# Patient Record
Sex: FEMALE | Race: WHITE | NOT HISPANIC OR LATINO | Employment: STUDENT | URBAN - METROPOLITAN AREA
[De-identification: names, ages, dates, MRNs, and addresses within clinical notes are randomized per-mention and may not be internally consistent; named-entity substitution may affect disease eponyms.]

---

## 2017-07-06 DIAGNOSIS — R41.3 MEMORY LOSS: Primary | ICD-10-CM

## 2017-07-06 DIAGNOSIS — I63.9 STROKE: Primary | ICD-10-CM

## 2017-07-12 ENCOUNTER — CLINICAL SUPPORT (OUTPATIENT)
Dept: REHABILITATION | Facility: HOSPITAL | Age: 37
End: 2017-07-12
Attending: GENERAL PRACTICE
Payer: MEDICAID

## 2017-07-12 DIAGNOSIS — R41.841 COGNITIVE COMMUNICATION DEFICIT: ICD-10-CM

## 2017-07-12 PROCEDURE — 92523 SPEECH SOUND LANG COMPREHEN: CPT | Mod: PO

## 2017-07-12 NOTE — PROGRESS NOTES
Date: 07/12/2017    Start Time:  1645  Stop Time:  1730      OUTPATIENT NEUROLOGICAL REHABILITATION  SPEECH THERAPY EVALUATION    Subjective/History  Onset Date:  May 2017  Primary Diagnosis:  Stroke with left side weakness  Treatment Diagnosis:  Cognitive-Communicative Impairment  Referring Provider:  Dr. Romy Torres  Orders: ST for evaluation and treat  Current Medical History:  Mrs. Reyna presents to the [facility] secondary to the diagnosis of memory loss due to stroke she suffered in May 2017. She stated that she has been having memory issues and forgetting things since her stroke. Mrs. Reyna is currently 6 weeks pregnant. She arrived to this evaluation with two of her four children.   Past Medical History: No past medical history on file.  Precautions:  Memory loss  Prior Therapy: ST Evaluation while in [facility] with no follow up therapy.   Pain: 0/10  Nutrition:  Regular consistency with thin liquids  Environmental Concerns/Cultural/Spiritual/Developmental/Educational Needs: None  Prior Level of Function: Independent  Social History:  Mrs. Reyna lives at home with her  and four children (ages 18 months to 9 years old). She is 6 weeks pregnant with her fifth child. The youngest child has Down's Syndrome and receives therapy through [facility]. 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 She reported that she was studying for her Nursing [email or web address] (exam) prior to her stroke.   Signs of Abuse: No  Functional Deficits Leading to Referral/Nature of Injury:  Memory loss  Patient Therapy Goals:  To improve her memory    Objective     Cognitive Linguistic Quick Test (CLQT) was administered to quickly assess the patient's overall cognitive-linguistic function and to determine cognitive strengths and weaknesses.             Cognitive Domain Score     Severity Rating      Attention     181/215    WNL  Memory    146/185    MILD  Executive Functions   29/40     WNL  Language    28/37     WNL  Visuospatial Skills    91/105     WNL  Clock Drawing    13/13     WNL     Composite Severity Rating  3.8/4.0     WNL      Auditory Comprehension: Within normal limits.    Reading Comprehension: Did not assess but will at next visit.     Verbal Expression: Within functional limits.     Written Expression: Ddi not assess but will at next visit.     Cognition: Short term memory was impaired. Mrs. Reyna recalled details from a story with 60% accuracy. She reported that she forgets appointments, children's scheduled activities, and sometimes she forgets to take her medications. Visual memory and visual attention were mildly impaired as demonstrated on a design memory task (100% accuracy) and symbol cancellation task (83% accuracy). Mental flexibility and thought organization were impaired (53% accuracy) as demonstrated in a design generation task. Mrs. Reyna listed 13 items in a concrete category and 6 items in an abstract category both within one minute (norm = 15 to 20).  Her attention/concentration, multi-tasking, organization skills were mildly impaired in daily tasks. She was able to attend to most of the tasks today while her children were in the therapy room. She reported that she does not use a calendar because she forgets to look at it. Further assessment of higher level problem solving and reasoning skills will be completed.     Motor Speech/Fluency/Voice: Oral motor exam revealed: Mild facial droop noted on the left side. Mild decreased strength and range of motion for lingual, buccal, labial, and velar muscles but considered within functional limits. Velum deviated to the left but had good elevation. Diadochokinetic (DDK) rates for rapid repetition of 1 - 3 syllable utterances were within functional limits. Speech was intelligible 100%  of the time. Voice and fluency were within functional limits.     Swallowing: No concerns were reported.     Hearing: Within functional limits.     Assessment/Impressions  Mrs. Reyna exhibited a mild cognitive-communicative impairment due to her stroke. Her deficits were characterized by decreased short term memory, decreased attention/concentration, decreased mental flexibility, decreased thought organization, and decrease word fluency. Higher level problem solving and reasoning skills will be further assessed. She does not use compensatory strategies consistently. Speech, voice, fluency, and swallowing skills were within functional limits. Mrs. Reyna will benefit from outpatient neurological rehabilitation speech therapy.    Functional Communication Measure (FCM):   Scores based on Severity Modifier for Medicare G-Code:  Memory  Current status: FCM:  Level 5   - CJ   Projected status:  FCM:  Level 6   - CI     Rehab Potential: good    Short Term Goals (4 weeks):   1. Mrs. Reyna will complete short term memory tasks using compensatory strategies with 90% accuracy IND'ly.   2. Mrs. Reyna complete moderate to complex problem solving, reasoning, and organization tasks with 90% accuracy IND'ly.   3. Mrs. Reyna complete mental flexibility/mental manipulation tasks with 90% accuracy IND'ly.   4. Mrs. Reyna will list 15 to 20 items in categories within one minute IND'ly.   5. Mrs. Reyna will complete moderate to complex attention/concentration tasks with 90% accuracy IND'ly.    Long Term Goals (6 weeks):   1. Mrs. Reyna will increase her cognitive-communication skills to improve functional independence.   2. Mrs. Reyna  Demonstrate understanding and use of compensatory strategies and/or external aids to improve cognitive-communicative skills and functional independence.     Education: Mrs. Renya was educated on the plan of care. She verbalized and demonstrated understanding and agreed with  the plan of care.     Plan  Certification Period: 07/06/17 to 12/31/17  Plan of Care Certification Period: 07/12/17 to 08/25/17    Recommended Treatment Plan:  Mrs. Reyna will participate in the [facility] program for speech therapy 2 times per week for 6 weeks to address her  Cognitive-communicative deficits, educate patient/family, and home exercise program.    Other Recommendations: None at this time    ADY Lorenzo, CCC-SLP  Speech-Language Pathologist  Outpatient Neurological Rehabilitation      Date: 07/12/2017    I certify the need for these services furnished under this plan of treatment and while under my care.  ____________________________________ Physician/Referring Practitioner   Date of Signature

## 2017-07-13 NOTE — PLAN OF CARE
Date: 07/12/2017    Start Time:  1645  Stop Time:  1730      OUTPATIENT NEUROLOGICAL REHABILITATION  SPEECH THERAPY EVALUATION    Subjective/History  Onset Date:  May 2017  Primary Diagnosis:  Stroke with left side weakness  Treatment Diagnosis:  Cognitive-Communicative Impairment  Referring Provider:  Dr. Romy Torres  Orders: ST for evaluation and treat  Current Medical History:  Mrs. Reyna presents to the [facility] secondary to the diagnosis of memory loss due to stroke she suffered in May 2017. She stated that she has been having memory issues and forgetting things since her stroke. Mrs. Reyna is currently 6 weeks pregnant. She arrived to this evaluation with two of her four children.   Past Medical History: No past medical history on file.  Precautions:  Memory loss  Prior Therapy: ST Evaluation while in [facility] with no follow up therapy.   Pain: 0/10  Nutrition:  Regular consistency with thin liquids  Environmental Concerns/Cultural/Spiritual/Developmental/Educational Needs: None  Prior Level of Function: Independent  Social History:  Mrs. Reyna lives at home with her  and four children (ages 18 months to 9 years old). She is 6 weeks pregnant with her fifth child. The youngest child has Down's Syndrome and receives therapy through [facility]. 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 She reported that she was studying for her Nursing [employer] (exam) prior to her stroke.   Signs of Abuse: No  Functional Deficits Leading to Referral/Nature of Injury:  Memory loss  Patient Therapy Goals:  To improve her memory    Objective     Cognitive Linguistic Quick Test (CLQT) was administered to quickly assess the patient's overall cognitive-linguistic function and to determine cognitive strengths and weaknesses.             Cognitive Domain Score     Severity Rating      Attention     181/215    WNL  Memory    146/185    MILD  Executive Functions   29/40     WNL  Language    28/37     WNL  Visuospatial Skills    91/105     WNL  Clock Drawing    13/13     WNL     Composite Severity Rating  3.8/4.0     WNL      Auditory Comprehension: Within normal limits.    Reading Comprehension: Did not assess but will at next visit.     Verbal Expression: Within functional limits.     Written Expression: Ddi not assess but will at next visit.     Cognition: Short term memory was impaired. Mrs. Reyna recalled details from a story with 60% accuracy. She reported that she forgets appointments, children's scheduled activities, and sometimes she forgets to take her medications. Visual memory and visual attention were mildly impaired as demonstrated on a design memory task (100% accuracy) and symbol cancellation task (83% accuracy). Mental flexibility and thought organization were impaired (53% accuracy) as demonstrated in a design generation task. Mrs. Reyna listed 13 items in a concrete category and 6 items in an abstract category both within one minute (norm = 15 to 20).  Her attention/concentration, multi-tasking, organization skills were mildly impaired in daily tasks. She was able to attend to most of the tasks today while her children were in the therapy room. She reported that she does not use a calendar because she forgets to look at it. Further assessment of higher level problem solving and reasoning skills will be completed.     Motor Speech/Fluency/Voice: Oral motor exam revealed: Mild facial droop noted on the left side. Mild decreased strength and range of motion for lingual, buccal, labial, and velar muscles but considered within functional limits. Velum deviated to the left but had good elevation. Diadochokinetic (DDK) rates for rapid repetition of 1 - 3 syllable utterances were within functional limits. Speech was intelligible 100%  of the time. Voice and fluency were within functional limits.     Swallowing: No concerns were reported.     Hearing: Within functional limits.     Assessment/Impressions  Mrs. Reyna exhibited a mild cognitive-communicative impairment due to her stroke. Her deficits were characterized by decreased short term memory, decreased attention/concentration, decreased mental flexibility, decreased thought organization, and decrease word fluency. Higher level problem solving and reasoning skills will be further assessed. She does not use compensatory strategies consistently. Speech, voice, fluency, and swallowing skills were within functional limits. Mrs. Reyna will benefit from outpatient neurological rehabilitation speech therapy.    Functional Communication Measure (FCM):   Scores based on Severity Modifier for Medicare G-Code:  Memory  Current status: FCM:  Level 5   - CJ   Projected status:  FCM:  Level 6   - CI     Rehab Potential: good    Short Term Goals (4 weeks):   1. Mrs. Reyna will complete short term memory tasks using compensatory strategies with 90% accuracy IND'ly.   2. Mrs. Reyna complete moderate to complex problem solving, reasoning, and organization tasks with 90% accuracy IND'ly.   3. Mrs. Reyna complete mental flexibility/mental manipulation tasks with 90% accuracy IND'ly.   4. Mrs. Reyna will list 15 to 20 items in categories within one minute IND'ly.   5. Mrs. Reyna will complete moderate to complex attention/concentration tasks with 90% accuracy IND'ly.    Long Term Goals (6 weeks):   1. Mrs. Reyna will increase her cognitive-communication skills to improve functional independence.   2. Mrs. Reyna  Demonstrate understanding and use of compensatory strategies and/or external aids to improve cognitive-communicative skills and functional independence.     Education: Mrs. Reyna was educated on the plan of care. She verbalized and demonstrated understanding and agreed with  the plan of care.     Plan  Certification Period: 07/06/17 to 12/31/17  Plan of Care Certification Period: 07/12/17 to 08/25/17    Recommended Treatment Plan:  Mrs. Reyna will participate in the [facility] program for speech therapy 2 times per week for 6 weeks to address her  Cognitive-communicative deficits, educate patient/family, and home exercise program.    Other Recommendations: None at this time    ADY Lorenzo, CCC-SLP  Speech-Language Pathologist  Outpatient Neurological Rehabilitation      Date: 07/12/2017    I certify the need for these services furnished under this plan of treatment and while under my care.  ____________________________________ Physician/Referring Practitioner   Date of Signature

## 2017-07-18 ENCOUNTER — CLINICAL SUPPORT (OUTPATIENT)
Dept: REHABILITATION | Facility: HOSPITAL | Age: 37
End: 2017-07-18
Attending: GENERAL PRACTICE
Payer: MEDICAID

## 2017-07-18 DIAGNOSIS — R41.841 COGNITIVE COMMUNICATION DEFICIT: Primary | ICD-10-CM

## 2017-07-18 PROCEDURE — 92507 TX SP LANG VOICE COMM INDIV: CPT | Mod: PO

## 2017-07-18 NOTE — PROGRESS NOTES
OUTPATIENT NEUROLOGICAL REHABILITATION  SPEECH THERAPY PROGRESS NOTE    Date:  07/18/2017    Start Time:  1305  Stop Time:  1345    Subjective/History  Onset Date:  May 2017  Primary Diagnosis:  Stroke with left side weakness  Treatment Diagnosis:  Cognitive-Communicative Impairment  Referring Provider:  Dr. Romy Torres  Orders: ST for evaluation and treat  Current Medical History:  Mrs. Reyna presents to the [facility] secondary to the diagnosis of memory loss due to stroke she suffered in May 2017. She stated that she has been having memory issues and forgetting things since her stroke. Mrs. Reyna is currently 6 weeks pregnant. She arrived to this evaluation with two of her four children.   Past Medical History: No past medical history on file.  Precautions:  General         TIMED  Procedure Time Min.    Start:   Stop:       Start:  Stop:     Start:  Stop:     Start:  Stop:          UNTIMED  Procedure Time Min.   Speech and Language Therapy Start:  1305  Stop: 1345  40    Start:  Stop:      Total Minutes: 40  Total Timed Units: 0  Total Untimed Units: 1  Charges Billed/# of units: 1    Visit #: 2  Date of Evaluation: 7/12/17  Plan of Care Expiration:  07/12/17 to 08/25/17     Extended POC:         Progress/Current Status    Subjective:     Pain: 0 /10  Pt was pleasant and communicative in therapy.     Objective:     Short Term Goals (4 weeks):   1. Mrs. Reyna will complete short term memory tasks using compensatory strategies with 90% accuracy IND'ly.   2. Mrs. Reyna complete moderate to complex problem solving, reasoning, and organization tasks with 90% accuracy IND'ly.   3. Mrs. Reyna complete mental flexibility/mental manipulation tasks with 90% accuracy IND'ly.   4. Mrs. Reyna will list 15 to 20 items in categories within one minute IND'ly.   5. Mrs. Reyna will complete moderate to complex attention/concentration tasks with 90% accuracy  "IND'ly.  6. New Goal:  Mrs. Reyna will complete an assessment of reading and writing skills. Goal MEt 7/18/17  6a. New Goal: Mrs. Reyna will complete complex reading comprehension tasks with 90% accuracy IND'ly.     Current Progress Toward Short Term Goals:  1.  Pt completed short term memory tasks with 100% accuracy including visual stimuli (i.e. Constant Therapy Picture N Back Level 1). Pt reports more difficulty with auditory stimuli.   - Pt reports forgetting when someone greets her or tells her goodbye. She is forgetting medications and to pick her children up from school. Discussed utilization of external memory aids. Pt reports that "I didn't have to do that before". SLP explained that these aids are necessary now post-stroke. Use of external memory aids such as a pill organizer, an alarm to remind pt to take medication, and a calendar (whether an abhilash or a paper calendar) were reviewed. Pt verbalized understanding.   2.  Pt completed deduction puzzle 1 with 100% accuracy IND'ly.   3. n/a  4. n/a  5. Pt completed an assessment of reading and writing skills. Pt was asked to read two paragraphs and answer whether statements were true, false, or unknown. She completed this task with 70% accuracy indicating impaired reading skills. She was asked to write a thank you note for a writing assessment. Pt completed task with no grammatical, syntactical, or semantic errors. Pt's writing skills are considered WFL.     Long Term Goals (6 weeks):   1. Mrs. Reyna will increase her cognitive-communication skills to improve functional independence.   2. Mrs. Reyna  Demonstrate understanding and use of compensatory strategies and/or external aids to improve cognitive-communicative skills and functional independence.     Assessment:     Mrs. Reyna exhibited a mild cognitive-communicative impairment due to her stroke. Her deficits were characterized by decreased short term memory, decreased attention/concentration, " "decreased mental flexibility, decreased thought organization, and decrease word fluency. Higher level problem solving and reasoning skills will be further assessed. She does not use compensatory strategies consistently. Speech, voice, fluency, and swallowing skills were within functional limits. Mrs. Reyna will benefit from outpatient neurological rehabilitation speech therapy.    Functional Communication Measure (FCM):   Scores based on Severity Modifier for Medicare G-Code:  Memory  Current status: FCM:  Level 5   - CJ   Projected status:  FCM:  Level 6   - CI     Rehab Potential: good    Patient Education/Response:     Pt reports forgetting when someone greets her or tells her goodbye. She is forgetting medications and to pick her children up from school. Discussed utilization of external memory aids. Pt reports that "I didn't have to do that before". SLP explained that these aids are necessary now post-stroke. Use of external memory aids such as a pill organizer, an alarm to remind pt to take medication, and a calendar (whether an abhilash or a paper calendar) were reviewed. Pt verbalized understanding.     Plans and Goals:     Continue POC.  Certification Period: 07/06/17 to 12/31/17  Plan of Care Certification Period: 07/12/17 to 08/25/17    Recommended Treatment Plan:  Mrs. Reyna will participate in the [facility] program for speech therapy 2 times per week for 6 weeks to address her  Cognitive-communicative deficits, educate patient/family, and home exercise program.    Other Recommendations: None at this time    ADY Linares, CCC-SLP  Speech Language Pathologist  7/18/17  "

## 2017-07-25 ENCOUNTER — CLINICAL SUPPORT (OUTPATIENT)
Dept: REHABILITATION | Facility: HOSPITAL | Age: 37
End: 2017-07-25
Attending: GENERAL PRACTICE
Payer: MEDICAID

## 2017-07-25 DIAGNOSIS — R41.841 COGNITIVE COMMUNICATION DEFICIT: ICD-10-CM

## 2017-07-25 PROCEDURE — 92507 TX SP LANG VOICE COMM INDIV: CPT | Mod: PO

## 2017-07-25 NOTE — PROGRESS NOTES
OUTPATIENT NEUROLOGICAL REHABILITATION  SPEECH THERAPY PROGRESS NOTE    Date:  07/25/2017    Start Time:  1305  Stop Time:  1345    Subjective/History  Onset Date:  May 2017  Primary Diagnosis:  Stroke with left side weakness  Treatment Diagnosis:  Cognitive-Communicative Impairment  Referring Provider:  Dr. Romy Torres  Orders: ST for evaluation and treat  Current Medical History:  Mrs. Reyna presents to the [facility] secondary to the diagnosis of memory loss due to stroke she suffered in May 2017. She stated that she has been having memory issues and forgetting things since her stroke. Mrs. Reyna is currently 6 weeks pregnant. She arrived to this evaluation with two of her four children.   Past Medical History: No past medical history on file.  Past Medical History: No past medical history on file.  Precautions:  general        TIMED  Procedure Time Min.    Start:   Stop:       Start:  Stop:     Start:  Stop:     Start:  Stop:          UNTIMED  Procedure Time Min.   Speech and Language Therapy Start:  1305  Stop: 1345  40    Start:  Stop:      Total Minutes: 40  Total Timed Units: 0  Total Untimed Units: 1  Charges Billed/# of units: 1    Visit #: 3  Date of Evaluation: 7/12/17  Plan of Care Expiration:  07/12/17 to 08/25/17     Extended POC:       Progress/Current Status    Subjective:     Pain: 6 /10  Pt reports a headache of about a 6. She reports morning sickness that has prevented her from eating the last few days.     Objective:   Short Term Goals (4 weeks):   1. Mrs. Reyna will complete short term memory tasks using compensatory strategies with 90% accuracy IND'ly.   2. Mrs. Reyna complete moderate to complex problem solving, reasoning, and organization tasks with 90% accuracy IND'ly.   3. Mrs. Reyna complete mental flexibility/mental manipulation tasks with 90% accuracy IND'ly.   4. Mrs. Reyna will list 15 to 20 items in categories within  one minute IND'ly.   5. Mrs. Reyna will complete moderate to complex attention/concentration tasks with 90% accuracy IND'ly.  6. Mrs. Reyna will complete an assessment of reading and writing skills. Goal MEt 7/18/17  6a. Mrs. Reyna will complete complex reading comprehension tasks with 90% accuracy IND'ly.     Current Progress Toward Short Term Goals:  1.  Pt reports more difficulty with auditory stimuli.   - pt completed [email or web address] fit test. She scored the same or better than 40% of same age  Peers for memory.   2.  Pt completed mental manipulation tasks with 100% accuracy INDly (i.e. Reverse order, 4 words). METx2  3. Pt completed [email or web address] fit test. She scored the same or better than 0% of osmin age peers for mental flexibility.    4. Pt named an average of 17 items in a concrete category. METx1  5. Pt completed [email or web address] fit test as part of home program. She scored the same or better than 41% of same age peers for attention.   6. Goal Met 7/18/17  6a. n/a    Long Term Goals (6 weeks):   1. Mrs. Reyna will increase her cognitive-communication skills to improve functional independence.   2. Mrs. Reyna  Demonstrate understanding and use of compensatory strategies and/or external aids to improve cognitive-communicative skills and functional independence.     Assessment:     Mrs. Reyna exhibited a mild cognitive-communicative impairment due to her stroke. Her deficits were characterized by decreased short term memory, decreased attention/concentration, decreased mental flexibility, decreased thought organization, and decrease word fluency. Higher level problem solving and reasoning skills will be further assessed. She does not use compensatory strategies consistently. Speech, voice, fluency, and swallowing skills were within functional limits. Mrs. Reyna will benefit from outpatient neurological rehabilitation speech therapy.    Functional Communication Measure (FCM):   Scores based on Severity  Modifier for Medicare G-Code:  Memory  Current status: FCM:  Level 5   - CJ   Projected status:  FCM:  Level 6   - CI     Rehab Potential: good    Patient Education/Response:     [employer] home program and [employer] abhilash as an external aid were reviewed with pt. Pt verbalized understanding.    Plans and Goals:   Continue POC.  Certification Period: 07/06/17 to 12/31/17  Plan of Care Certification Period: 07/12/17 to 08/25/17    Recommended Treatment Plan:  Mrs. Reyna will participate in the [facility] program for speech therapy 2 times per week for 6 weeks to address her  Cognitive-communicative deficits, educate patient/family, and home exercise program.    Other Recommendations: None at this time    ADY Linares, CCC-SLP  Speech Language Pathologist  7/25/2017

## 2017-08-01 ENCOUNTER — CLINICAL SUPPORT (OUTPATIENT)
Dept: REHABILITATION | Facility: HOSPITAL | Age: 37
End: 2017-08-01
Attending: GENERAL PRACTICE
Payer: MEDICAID

## 2017-08-01 DIAGNOSIS — R41.841 COGNITIVE COMMUNICATION DEFICIT: Primary | ICD-10-CM

## 2017-08-01 PROCEDURE — 92507 TX SP LANG VOICE COMM INDIV: CPT | Mod: PO

## 2017-08-08 ENCOUNTER — CLINICAL SUPPORT (OUTPATIENT)
Dept: REHABILITATION | Facility: HOSPITAL | Age: 37
End: 2017-08-08
Attending: GENERAL PRACTICE
Payer: MEDICAID

## 2017-08-08 DIAGNOSIS — R41.841 COGNITIVE COMMUNICATION DEFICIT: ICD-10-CM

## 2017-08-08 PROCEDURE — 92507 TX SP LANG VOICE COMM INDIV: CPT | Mod: PO

## 2017-08-08 NOTE — PROGRESS NOTES
OUTPATIENT NEUROLOGICAL REHABILITATION  SPEECH THERAPY DISCHARGE SUMMARY    Date:  08/08/2017    Start Time:  1345  Stop Time:  1430    Subjective/History  Onset Date:  May 2017  Primary Diagnosis:  Stroke with left side weakness  Treatment Diagnosis:  Cognitive-Communicative Impairment  Referring Provider:  Dr. Romy Torres  Orders: ST for evaluation and treat  Current Medical History:  Mrs. Reyna presents to the [facility] secondary to the diagnosis of memory loss due to stroke she suffered in May 2017. She stated that she has been having memory issues and forgetting things since her stroke. Mrs. Reyna is currently 6 weeks pregnant. She arrived to this evaluation with two of her four children.   Past Medical History: No past medical history on file.  Precautions:  general        TIMED  Procedure Time Min.    Start:   Stop:       Start:  Stop:     Start:  Stop:     Start:  Stop:          UNTIMED  Procedure Time Min.   Speech and Language Therapy Start:  1345  Stop: 1430  45    Start:  Stop:      Total Minutes: 45  Total Timed Units: 0  Total Untimed Units: 1  Charges Billed/# of units: 1    Visit #: 5  Total # Of Visits:  6  Cancelled:  1  No Shows:  0  Date of Evaluation: 7/12/17  Plan of Care Expiration:  07/12/17 to 08/25/17     Extended POC:       Progress/Current Status    Subjective:     Pain: 0 /10  Pt reports that she is doing well.     Objective:   Physical/Functional Status:  At time of discharge, Mrs.. Reyna was able to recall memory strategies and suggested implementation for her daily life.  Short Term Goals (4 weeks):   1. Mrs. Reyna will complete short term memory tasks using compensatory strategies with 90% accuracy IND'ly. Goal Not Met / Discontinue  - Mrs. Reyna has not begun to use any compensatory strategies in her home. Until such time, Mrs. Reyna will discontinue ST. She attempted to download apps today to utilize for external  memory aids.     2. Mrs. Reyna complete moderate to complex problem solving, reasoning, and organization tasks with 90% accuracy IND'ly. Goal Met 8/8/17   - Mrs. Reyna completes moderate to complex problem solving, reasoning, and organization tasks IND'ly.     3. Mrs. Reyna complete mental flexibility/mental manipulation tasks with 90% accuracy IND'ly. Goal Not Met / Discontinue  - Mrs. Reyna scored the same or better than 0% of same age peers for mental flexibility for [email or web address] fit test.     4. Mrs. Reyna will list 15 to 20 items in categories within one minute IND'ly. Goal Met 8/8/17   - Pt named an average of 17 items in a category within one minute.     5. Mrs. Reyna will complete moderate to complex attention/concentration tasks with 90% accuracy IND'ly. Goal Met 8/8/17  - pt completed sustained attention tasks with no redirections. She reports no difficulties with attention / concentration at home.      6. Mrs. Reyna will complete an assessment of reading and writing skills. Goal Met 7/18/17  6a. Mrs. Reyna will complete complex reading comprehension tasks with 90% accuracy IND'ly. 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     Long Term Goals (6 weeks):   1. Mrs. Reyna will increase her cognitive-communication skills to improve functional independence. Goal Met 8/8/17   2. Mrs. Reyna  Demonstrate understanding and use of compensatory strategies and/or external aids to improve cognitive-communicative skills and functional independence. Goal Met 8/8/17     Assessment:     Mrs. Reyna exhibited a mild cognitive-communicative impairment due to her stroke. Her deficits were characterized by decreased short term memory, decreased attention/concentration, decreased mental flexibility, decreased thought organization, and decrease word fluency. Higher level problem solving and reasoning skills will be further assessed. She  does not use compensatory strategies consistently. Speech, voice, fluency, and swallowing skills were within functional limits. Mrs. Reyna no longer will benefit from skilled outpatient neurological rehabilitation speech therapy.    Functional Communication Measure (FCM):   Scores based on Severity Modifier for Medicare G-Code:  Memory  Current status: FCM:  Level 5   - CJ   Projected status:  FCM:  Level 6   - CI     Rehab Potential: good    Patient Education/Response:     Discharge plan reviewed with pt. Pt verbalized understanding.    Plans and Goals:     D/c ST.     Discharge Plan:  Mrs. Reyna  is being discharged from outpatient neuro rehab speech therapy for the following reason(s):  Patient has reached the maximum rehab potential for the present time      ADY Linares, CCC-SLP   8/8/2017

## 2017-09-11 PROBLEM — R41.841 COGNITIVE COMMUNICATION DEFICIT: Status: RESOLVED | Noted: 2017-07-12 | Resolved: 2017-09-11

## 2017-12-25 ENCOUNTER — OFFICE VISIT (OUTPATIENT)
Dept: URGENT CARE | Facility: CLINIC | Age: 37
End: 2017-12-25
Payer: MEDICAID

## 2017-12-25 VITALS
WEIGHT: 174 LBS | HEART RATE: 112 BPM | TEMPERATURE: 99 F | HEIGHT: 68 IN | SYSTOLIC BLOOD PRESSURE: 109 MMHG | DIASTOLIC BLOOD PRESSURE: 65 MMHG | OXYGEN SATURATION: 98 % | RESPIRATION RATE: 18 BRPM | BODY MASS INDEX: 26.37 KG/M2

## 2017-12-25 DIAGNOSIS — J02.9 SORE THROAT: ICD-10-CM

## 2017-12-25 DIAGNOSIS — J10.1 INFLUENZA A: Primary | ICD-10-CM

## 2017-12-25 LAB
CTP QC/QA: YES
CTP QC/QA: YES
FLUAV AG NPH QL: POSITIVE
FLUBV AG NPH QL: NEGATIVE
S PYO RRNA THROAT QL PROBE: NEGATIVE

## 2017-12-25 PROCEDURE — 87804 INFLUENZA ASSAY W/OPTIC: CPT | Mod: QW,S$GLB,, | Performed by: NURSE PRACTITIONER

## 2017-12-25 PROCEDURE — 99214 OFFICE O/P EST MOD 30 MIN: CPT | Mod: 25,S$GLB,, | Performed by: NURSE PRACTITIONER

## 2017-12-25 PROCEDURE — 87880 STREP A ASSAY W/OPTIC: CPT | Mod: QW,S$GLB,, | Performed by: NURSE PRACTITIONER

## 2017-12-25 RX ORDER — ENOXAPARIN SODIUM 100 MG/ML
1 INJECTION SUBCUTANEOUS
COMMUNITY

## 2017-12-25 RX ORDER — OSELTAMIVIR PHOSPHATE 75 MG/1
75 CAPSULE ORAL 2 TIMES DAILY
Qty: 10 CAPSULE | Refills: 0 | Status: SHIPPED | OUTPATIENT
Start: 2017-12-25 | End: 2017-12-25 | Stop reason: SDUPTHER

## 2017-12-25 RX ORDER — OSELTAMIVIR PHOSPHATE 75 MG/1
75 CAPSULE ORAL 2 TIMES DAILY
Qty: 10 CAPSULE | Refills: 0 | Status: SHIPPED | OUTPATIENT
Start: 2017-12-25 | End: 2017-12-30

## 2017-12-25 RX ORDER — ASPIRIN 325 MG
325 TABLET ORAL DAILY
COMMUNITY

## 2017-12-25 NOTE — PROGRESS NOTES
"Subjective:       Patient ID: Katty Reyna is a 37 y.o. female.    Vitals:  height is 5' 8" (1.727 m) and weight is 78.9 kg (174 lb). Her temperature is 99.3 °F (37.4 °C). Her blood pressure is 109/65 and her pulse is 112 (abnormal). Her respiration is 18 and oxygen saturation is 98%.     Chief Complaint: URI    URI    This is a new problem. The current episode started yesterday. There has been no fever. Associated symptoms include congestion, coughing and a sore throat. Pertinent negatives include no abdominal pain, chest pain, ear pain, headaches, nausea or wheezing. She has tried acetaminophen for the symptoms.     Review of Systems   Constitution: Negative for chills, fever and malaise/fatigue.   HENT: Positive for congestion and sore throat. Negative for ear pain and hoarse voice.    Eyes: Negative for discharge and redness.   Cardiovascular: Negative for chest pain, dyspnea on exertion and leg swelling.   Respiratory: Positive for cough and sputum production. Negative for shortness of breath and wheezing.    Musculoskeletal: Negative for myalgias.   Gastrointestinal: Negative for abdominal pain and nausea.   Neurological: Negative for headaches.       Objective:      Physical Exam   Constitutional: She is oriented to person, place, and time. She appears well-developed and well-nourished. She is cooperative.  Non-toxic appearance. She does not appear ill. No distress.   HENT:   Head: Normocephalic and atraumatic.   Right Ear: Hearing, tympanic membrane, external ear and ear canal normal.   Left Ear: Hearing, tympanic membrane and ear canal normal.   Nose: No mucosal edema, rhinorrhea or nasal deformity. No epistaxis. Right sinus exhibits no maxillary sinus tenderness and no frontal sinus tenderness. Left sinus exhibits no maxillary sinus tenderness and no frontal sinus tenderness.   Mouth/Throat: Uvula is midline and mucous membranes are normal. No trismus in the jaw. Normal dentition. No uvula swelling. No " posterior oropharyngeal erythema.   Eyes: Conjunctivae and lids are normal. Right eye exhibits no discharge. Left eye exhibits no discharge. No scleral icterus.   Sclera clear bilat   Neck: Trachea normal, normal range of motion, full passive range of motion without pain and phonation normal. Neck supple.   Cardiovascular: Normal rate, regular rhythm and normal pulses.    Pulmonary/Chest: No respiratory distress.   Abdominal: Normal appearance. She exhibits no distension, no pulsatile midline mass and no mass. There is no tenderness.   Musculoskeletal: Normal range of motion. She exhibits no edema or deformity.   Neurological: She is alert and oriented to person, place, and time. She exhibits normal muscle tone. Coordination normal.   Skin: Skin is warm, dry and intact. She is not diaphoretic. No pallor.   Psychiatric: She has a normal mood and affect. Her speech is normal and behavior is normal. Judgment and thought content normal. Cognition and memory are normal.   Nursing note and vitals reviewed.        Results for orders placed or performed in visit on 12/25/17   POCT Influenza A/B   Result Value Ref Range    Rapid Influenza A Ag Positive (A) Negative    Rapid Influenza B Ag Negative Negative     Acceptable Yes    POCT rapid strep A   Result Value Ref Range    Rapid Strep A Screen Negative Negative     Acceptable Yes        Assessment:       1. Influenza A    2. Sore throat        Plan:         Influenza A    Sore throat  -     POCT Influenza A/B  -     POCT rapid strep A    Other orders  -     oseltamivir (TAMIFLU) 75 MG capsule; Take 1 capsule (75 mg total) by mouth 2 (two) times daily.  Dispense: 10 capsule; Refill: 0

## 2017-12-25 NOTE — PATIENT INSTRUCTIONS

## 2024-01-29 ENCOUNTER — HOSPITAL ENCOUNTER (EMERGENCY)
Facility: HOSPITAL | Age: 44
Discharge: HOME | End: 2024-01-29
Attending: EMERGENCY MEDICINE
Payer: MEDICAID

## 2024-01-29 VITALS
TEMPERATURE: 98 F | OXYGEN SATURATION: 99 % | RESPIRATION RATE: 20 BRPM | BODY MASS INDEX: 27.98 KG/M2 | HEART RATE: 80 BPM | WEIGHT: 184 LBS
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
## 2024-01-29 DIAGNOSIS — S50.01XA CONTUSION OF RIGHT ELBOW, INITIAL ENCOUNTER: Primary | ICD-10-CM

## 2024-01-29 DIAGNOSIS — R52 PAIN: ICD-10-CM

## 2024-01-29 LAB
B-HCG UR QL: NEGATIVE
CTP QC/QA: YES

## 2024-01-29 PROCEDURE — 81025 URINE PREGNANCY TEST: CPT | Performed by: EMERGENCY MEDICINE

## 2024-01-29 PROCEDURE — 99283 EMERGENCY DEPT VISIT LOW MDM: CPT | Mod: 25
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
